# Patient Record
Sex: MALE | Race: WHITE | HISPANIC OR LATINO | Employment: FULL TIME | URBAN - METROPOLITAN AREA
[De-identification: names, ages, dates, MRNs, and addresses within clinical notes are randomized per-mention and may not be internally consistent; named-entity substitution may affect disease eponyms.]

---

## 2021-01-14 ENCOUNTER — AMBULATORY - HEALTHEAST (OUTPATIENT)
Dept: CARDIOLOGY | Facility: CLINIC | Age: 26
End: 2021-01-14

## 2021-01-14 DIAGNOSIS — Z00.6 EXAMINATION OF PARTICIPANT IN CLINICAL TRIAL: ICD-10-CM

## 2021-01-14 ASSESSMENT — MIFFLIN-ST. JEOR: SCORE: 1679.11

## 2021-02-04 ENCOUNTER — AMBULATORY - HEALTHEAST (OUTPATIENT)
Dept: CARDIOLOGY | Facility: CLINIC | Age: 26
End: 2021-02-04

## 2021-02-04 DIAGNOSIS — Z00.6 EXAMINATION OF PARTICIPANT IN CLINICAL TRIAL: ICD-10-CM

## 2021-02-18 ENCOUNTER — AMBULATORY - HEALTHEAST (OUTPATIENT)
Dept: CARDIOLOGY | Facility: CLINIC | Age: 26
End: 2021-02-18

## 2021-02-18 DIAGNOSIS — Z00.6 EXAMINATION OF PARTICIPANT OR CONTROL IN CLINICAL RESEARCH: ICD-10-CM

## 2021-05-21 ENCOUNTER — OFFICE VISIT (OUTPATIENT)
Dept: URGENT CARE | Facility: URGENT CARE | Age: 26
End: 2021-05-21
Payer: OTHER MISCELLANEOUS

## 2021-05-21 VITALS
SYSTOLIC BLOOD PRESSURE: 120 MMHG | TEMPERATURE: 96 F | WEIGHT: 135 LBS | OXYGEN SATURATION: 99 % | HEART RATE: 84 BPM | DIASTOLIC BLOOD PRESSURE: 77 MMHG

## 2021-05-21 DIAGNOSIS — S61.206A: Primary | ICD-10-CM

## 2021-05-21 PROCEDURE — 99207 PR NO CHARGE LOS: CPT | Performed by: FAMILY MEDICINE

## 2021-05-21 PROCEDURE — 12001 RPR S/N/AX/GEN/TRNK 2.5CM/<: CPT | Performed by: FAMILY MEDICINE

## 2021-05-22 NOTE — PATIENT INSTRUCTIONS
Patient Education     Laceration of an Arm or Leg: Stitches, Staples, or Tape   A laceration is a cut through the skin. If it's deep or it's gaping open, it may require stitches or staples to close so it can heal. Minor cuts may be treated with surgical tape closures, or skin glue.   X-rays may be done if something may have entered the skin through the cut. You may also need a tetanus shot if you are not up to date on this vaccine.   Home care    Follow the healthcare provider s instructions on how to care for the cut.    Wash your hands with soap and clean, running water before and after caring for your wound. This is to help prevent infection.    Keep the wound clean and dry. If a bandage was applied and it becomes wet or dirty, replace it. Otherwise, leave it in place for the first 24 hours, then change it once a day or as directed.    If stitches or staples were used, clean the wound daily:  ? After removing the bandage, wash the area with soap and water. Use a wet cotton swab to loosen and remove any blood or crust that forms.  ? After cleaning, keep the wound clean and dry. Talk with your healthcare provider before putting any antibiotic ointment on the wound. Reapply the bandage.    Remove the bandage to shower as usual after the first 24 hours, but don't soak the area in water (no swimming) until the stitches or staples are removed.    If surgical tape closures were used, keep the area clean and dry. If it becomes wet, blot it dry with a towel. Let the surgical tape fall off on its own.    Follow the healthcare provider's instructions for any medicines prescribed.  ? The provider may prescribe an antibiotic cream or ointment to prevent infection. He or she may also prescribe an antibiotic pill. Don't stop taking this medicine until you have finished it all or the provider tells you to stop.  ? The provider may also prescribe medicine for pain. Follow the instructions exactly for how to take these  medicines.    Don't do activities that may reopen your wound.    Follow-up care  Follow up with your healthcare provider, or as advised. Most skin wounds heal within 10 days. But an infection may sometimes occur even with proper treatment. Check the wound daily for the signs of infection listed below. Stitches and staples should be removed within 7 to14 days. If surgical tape closures were used, you may remove them after 10 days if they have not fallen off by then.    When to seek medical advice  Call your healthcare provider right away if any of these occur:    Wound bleeding not controlled by direct pressure    Signs of infection, including increasing pain in the wound, increasing wound redness or swelling, or pus or bad odor coming from the wound    Chills, fever of 100.4 F (38 C) or higher, or as directed by your healthcare provider    Stitches or staples coming apart or falling out or surgical tape falling off before 7 days    Wound edges reopening    Color changes in the wound    Numbness around the wound     Decreased movement around the injured area  [employer] last reviewed this educational content on 6/1/2020 2000-2021 [employer]. All rights reserved. This information is not intended as a substitute for professional medical care. Always follow your healthcare professional's instructions.

## 2021-05-22 NOTE — PROGRESS NOTES
Assessment & Plan     Open wound of right little finger without damage to nail, initial encounter  Wound was closed as noted below in the procedure note.  Follow-up is recommended in 1 week for suture removal.  Work status report was completed.  Indications for follow-up were reviewed.  Risks of ischemic injury to the flap were reviewed with the patient.  After cares were also reviewed.  - REPAIR SUPERFICIAL, WOUND BODY < =2.5CM                 Return in about 1 week (around 5/28/2021) for suture removal.    Miguel Ahuja MD  [facility] [place] Harrison is a 25 year old who presents for the following health issues     HPI       Patient comes in with work comp injury.  Date of injury is May 21, 2021, employer is [employer].  Patient was pushing a cart with a white board on it when he sustained a laceration to the distal portion of his right fifth finger.  He was told to go to urgent care for an assessment after reporting it to his supervisor.    Review of Systems   Constitutional, HEENT, cardiovascular, pulmonary, gi and gu systems are negative, except as otherwise noted.      Objective    /77 (BP Location: Right arm, Patient Position: Sitting, Cuff Size: Adult Regular)   Pulse 84   Temp 96  F (35.6  C) (Tympanic)   Wt 61.2 kg (135 lb)   SpO2 99%   There is no height or weight on file to calculate BMI.  Physical Exam   GENERAL: healthy, alert and no distress  EYES: Eyes grossly normal to inspection, PERRL and conjunctivae and sclerae normal  MS: There is a V shaped laceration on the flexor aspect of the right fifth finger.  Flap attachment is distal.  There is no nail involvement.  Distal neurovascular function was assessed prior to instillation of anesthesia and normal.  Flexor tendon function was normal.  NEURO: Normal strength and tone, mentation intact and speech normal  PSYCH: mentation appears normal, affect normal/bright        PROCEDURE NOTE:    Patient was  placed in the supine position.  The finger was cleaned with Betadine.  No foreign bodies were noted.  Anesthesia was instilled locally through the laceration.  It was a very sharp V-shaped cut with the attachment distal.  5 sutures were placed along the longer side and one suture was placed along the shorter side of the V.  Total of six 4-0 nylon sutures were placed.  Patient tolerated procedure well with good circulation afterwards.  No complications were noted.

## 2021-05-27 VITALS
TEMPERATURE: 97.2 F | RESPIRATION RATE: 16 BRPM | SYSTOLIC BLOOD PRESSURE: 99 MMHG | OXYGEN SATURATION: 97 % | HEART RATE: 69 BPM | DIASTOLIC BLOOD PRESSURE: 61 MMHG

## 2021-05-28 ENCOUNTER — OFFICE VISIT (OUTPATIENT)
Dept: URGENT CARE | Facility: URGENT CARE | Age: 26
End: 2021-05-28
Payer: OTHER MISCELLANEOUS

## 2021-05-28 VITALS
DIASTOLIC BLOOD PRESSURE: 62 MMHG | SYSTOLIC BLOOD PRESSURE: 110 MMHG | TEMPERATURE: 97.9 F | OXYGEN SATURATION: 97 % | HEART RATE: 65 BPM

## 2021-05-28 DIAGNOSIS — S61.206A: Primary | ICD-10-CM

## 2021-05-28 PROCEDURE — 99207 PR NO CHARGE LOS: CPT | Performed by: FAMILY MEDICINE

## 2021-05-28 NOTE — LETTER
REPORT OF WORK COMP    [facility] [place]  [address]  [place] [id number] [phone]      PATIENT DATA    Employee Name: Hunter Huitron      : 1995     #: [id number]    Work related injury: Yes  Employer at time of injury: [employer]  Employer contact & phone:    Employed elsewhere? No  Workers' Compensation Carrier/Managed Care Plan:       Today's date: 2021  Date of injury: 2021  Date of first visit: 2021    PROVIDER EVALUATION: Please fill in as needed.  Please give copy to employee for employer.    1. Diagnosis: open wound right fifth finger    2. Treatment: suturing.  3. Medication: none other than otc  NOTE: When ordering a medication, [place] Rules require [place] or WC on prescriptions.    4. No work from NA to NA.  5. Return to work date: 2021   ** WITH RESTRICTIONS? Yes, with work restrictions: * Keep injury site clean and dry  DURATION OF LIMITATIONS: No immersion for the next 3 days, allow dressing to be worn at work.  Restrictions  on 2021      RESTRICTIONS: Unlimited unless listed.  Restrictions apply to home and leisure also.  If work restrictions is not available, the employee is totally disabled.    Maximum Medical Improvement (Date): 2021  Any Permanent Partial Disability? 0%    Provider comments: follow up prn    Medical Examiner: Miguel Ahuja MD            License or registration: [id number]    Next appointment: As needed    CC: Employer, Managed Care Plan/Payor, Patient

## 2021-05-29 NOTE — PROGRESS NOTES
Assessment & Plan     Open wound of right little finger without damage to nail, initial encounter  Sutures were removed today.  After cares were reviewed.  Work status report was completed.  Follow-up is recommended as needed.                   No follow-ups on file.    Miguel Ahuja MD    [facility] SOL[place] Harrison is a 26 year old who presents for the following health issues     HPI       Patient comes in for follow-up of work comp injury.  Employer is [employer].  Date of injury is May 21, 2021.  Injury was a laceration to the right fifth finger.  He returns today for suture removal.  Minimal discomfort is noted.  Tolerating work okay.    Review of Systems   Constitutional, HEENT, cardiovascular, pulmonary, gi and gu systems are negative, except as otherwise noted.      Objective    /62   Pulse 65   Temp 97.9  F (36.6  C) (Tympanic)   SpO2 97%   There is no height or weight on file to calculate BMI.  Physical Exam   GENERAL: healthy, alert and no distress  EYES: Eyes grossly normal to inspection, PERRL and conjunctivae and sclerae normal  SKIN: Sutures were removed.  There was a little bit of separation distally but the V-shaped flap is viable.  Distal neurovascular function is intact.  NEURO: Normal strength and tone, mentation intact and speech normal  PSYCH: mentation appears normal, affect normal/bright

## 2021-06-05 VITALS
SYSTOLIC BLOOD PRESSURE: 127 MMHG | HEIGHT: 72 IN | HEART RATE: 74 BPM | RESPIRATION RATE: 18 BRPM | TEMPERATURE: 97.7 F | BODY MASS INDEX: 19.23 KG/M2 | OXYGEN SATURATION: 99 % | WEIGHT: 142 LBS | DIASTOLIC BLOOD PRESSURE: 62 MMHG

## 2021-06-05 VITALS
WEIGHT: 135.7 LBS | DIASTOLIC BLOOD PRESSURE: 70 MMHG | BODY MASS INDEX: 18.18 KG/M2 | RESPIRATION RATE: 15 BRPM | SYSTOLIC BLOOD PRESSURE: 123 MMHG | TEMPERATURE: 97.8 F | OXYGEN SATURATION: 98 % | HEART RATE: 73 BPM

## 2021-06-27 ENCOUNTER — HEALTH MAINTENANCE LETTER (OUTPATIENT)
Age: 26
End: 2021-06-27

## 2021-06-30 NOTE — PROGRESS NOTES
Progress Notes by Akash Lynne RN at 2/18/2021 10:30 AM     Author: Akash Lynne RN Service: -- Author Type: Registered Nurse    Filed: 2/18/2021 11:10 AM Encounter Date: 2/18/2021 Status: Signed    : Akash Lynne RN (Registered Nurse)           Visit 3  02/18/2021  In person, scheduled  In the last 2 weeks, did the participant attend any large gatherings (> 10 people), or come in close contact (<6 feet) with a person known to  have COVID-19, returned to school or to work in-person?  [] Yes   [x] No    Ongoing consent process: review schedule of events for today, and upcoming appointments; provided him with an update on known overall study progress; reviewed eDiary expectations; reiterated expected AEs (especially associated with injection site).  he has no further questions or concerns at this time.     Adverse Event/Serious Adverse Event: asked participant if any AE/SAEs occurred since last contact 02/04/2021   he denies AE/SAEs     Physical Exam   Please refer to NP note for PE details  /70 (Patient Site: Left Arm, Patient Position: Sitting, Cuff Size: Adult Regular)   Pulse 73   Temp 97.8  F (36.6  C)   Resp 15   Wt 135 lb 11.2 oz (61.6 kg)   SpO2 98%   BMI 18.18 kg/m          Study labs ([facility], Immunogenicity, SARS-CoV-2 (anti-NP)  drawn   [x] Yes   Time 10.53 [] No, why?   Requisition number:  [id number]      Reviewed eDiary with participant.  Study team reviewed completion instructions with participant and questions answered to his satisfaction.      Plan: Next study visit (Visit 4) to be scheduled.   NOTE: remind participant that this next visit will occur @ [facility] (noted on AVS)      Akash Lynne RN

## 2021-06-30 NOTE — PROGRESS NOTES
Progress Notes by Gisella Carmen PA-C at 1/14/2021  9:00 AM     Author: Gisella Carmen PA-C Service: -- Author Type: Physician Assistant    Filed: 1/14/2021  9:47 AM Encounter Date: 1/14/2021 Status: Signed    : Gisella Carmen PA-C (Physician Assistant)             What was the body system examined: Defaulted value based on protocol requirements and will not require .   System  Normal/Abnormal  If abnormal, CS?   If abnormal, describe    Skin  Normal  Not applicable     HEENT Normal  Not applicable     Neck  Normal  Not applicable     Thyroid  Normal  Not applicable      Lungs  Normal  Not applicable     Heart  Normal  Not applicable     Abdomen  Normal  Not applicable     Lymph Nodes  Normal  Not applicable     Musculoskeletal/Extremities  Normal  Not applicable     Other  Normal  Not applicable        What was the overall interpretation: Please select from the dropdown list.  Normal    AMY Valle PA-C

## 2021-06-30 NOTE — PROGRESS NOTES
"Progress Notes by Stefanie Handley RN at 2/4/2021 10:30 AM     Author: Stefanie Handley RN Service: -- Author Type: Registered Nurse    Filed: 2/5/2021  9:46 AM Encounter Date: 2/4/2021 Status: Addendum    : Stefanie Handley RN (Registered Nurse)    Related Notes: Original Note by Stefanie Handley RN (Registered Nurse) filed at 2/4/2021 12:03 PM     Summary: Novavax Day 21          Visit 2  2/4/21  In person, scheduled  Seated @ 10:49  In the last 2 weeks, did the participant attend any large gatherings (> 10 people), or come in close contact (<6 feet) with a person known to  have COVID-19, returned to school or to work in-person?  [] Yes   [x] No    Reviewed Inclusion/Exclusion criteria--please refer to that note for details and for co-sign by Dr. Mejia.     Ongoing consent process: review schedule of events for today, and upcoming appointments; provided him with an update on known overall study progress; reviewed eDiary expectations; reiterated expected AEs.    he has no further questions or concerns at this time.     Adverse Event/Serious Adverse Event: asked participant if any AE/SAEs occurred since last contact 1/14/21.   he denies AE/SAEs    Physical Exam   Please refer to Gisella Carmen note for PE details    Visit Vitals completed 11:01  after being seated for 10\"    BP 99/61   Pulse 69   Temp 97.2  F (36.2  C) (Oral)   Resp 16   SpO2 97%       Study labs SARS-CoV-2 vaccine immunogenicity (IgG antibody to SARS-CoV-2 S protein, [place], hACE2 inhibition) drawn   [x] Yes   Time 11:10      [] No, why?        Reviewed eDiary with participant.  Study team reviewed completion instructions with participant and questions answered to his satisfaction.  Participant waited in Clinical Research Unit (CRU) for @ least 30 minutes after receiving injection.  Participant experienced no adverse symptoms prior to leaving CRU    Plan: Next study visit (Visit 3) scheduled 2/18/21@1030.      Stefanie Handley RN         "

## 2021-06-30 NOTE — PROGRESS NOTES
Progress Notes by Gisella Carmen PA-C at 2/4/2021 10:30 AM     Author: Gisella Carmen PA-C Service: -- Author Type: Physician Assistant    Filed: 2/4/2021 11:37 AM Encounter Date: 2/4/2021 Status: Signed    : Gisella Carmen PA-C (Physician Assistant)             What was the body system examined: Defaulted value based on protocol requirements and will not require .   System  Normal/Abnormal  If abnormal, CS?   If abnormal, describe    Skin  Normal  Not applicable     HEENT Normal  Not applicable     Neck  Normal  Not applicable     Thyroid  Normal  Not applicable      Lungs  Normal  Not applicable     Heart  Normal  Not applicable     Abdomen  Normal  Not applicable     Lymph Nodes  Normal  Not applicable     Musculoskeletal/Extremities  Normal  Not applicable     Other  Normal  Not applicable        What was the overall interpretation: Please select from the dropdown list.  Normal    AMY Valle PA-C

## 2021-06-30 NOTE — PROGRESS NOTES
Progress Notes by Gisella Carmen PA-C at 2/18/2021 10:30 AM     Author: Gisella Carmen PA-C Service: -- Author Type: Physician Assistant    Filed: 2/18/2021 11:23 AM Encounter Date: 2/18/2021 Status: Signed    : Gisella Carmen PA-C (Physician Assistant)             What was the body system examined: Defaulted value based on protocol requirements and will not require .   System  Normal/Abnormal  If abnormal, CS?   If abnormal, describe    Skin  Normal  Not applicable     HEENT Normal  Not applicable     Neck  Normal  Not applicable     Thyroid  Normal  Not applicable      Lungs  Normal  Not applicable     Heart  Normal  Not applicable     Abdomen  Normal  Not applicable     Lymph Nodes  Normal  Not applicable     Musculoskeletal/Extremities  Normal  Not applicable     Other  Normal  Not applicable        What was the overall interpretation: Please select from the dropdown list.  Normal    AMY Valle PA-C

## 2021-06-30 NOTE — PROGRESS NOTES
"Progress Notes by Aniyah Olivares RN at 1/14/2021  9:00 AM     Author: Aniyah Olivares RN Service: -- Author Type: Registered Nurse    Filed: 1/14/2021 12:44 PM Encounter Date: 1/14/2021 Status: Signed    : Elena Mejia MD (Physician)    Related Notes: Original Note by Aniyah Olivares RN (Registered Nurse) filed at 1/14/2021 11:22 AM           Study Name: PREVENT-19  : Eli Davis MD   Sub Investigator: Satish Mejia MD    Protocol version: 3.0, 16 NOV 2020    Inclusion Criteria  Criteria #   Inclusion Criteria (all must be yes)    1  Adults ? 18 years of age at screening who, by virtue of age, race, ethnicity or life circumstances, are considered at substantial risk of exposure to and infection with SARS-CoV-2   Yes   2  Willing and able to give informed consent prior to study enrollment and to comply with study procedu   Yes   3  Participants of childbearing potential (defined as any participant who has experienced menarche and who is NOT surgically sterile [ie, hysterectomy, bilateral tubal ligation, or bilateral oophorectomy] or postmenopausal [defined as amenorrhea at least 12 consecutive months]) must agree to be heterosexually inactive from at least 28 days prior to enrollment and through 3 months after the last vaccination OR agree to consistently use a medically acceptable method of contraception from at least 28 days prior to enrollment and through 3 months after the last vaccination   Yes   4  Is medically stable, as determined by the investigator (based on review of health status, vital signs (TPRBP) medical history, & targeted physical examination (weight)  VS must be within medically acceptable ranges prior to the first vaccination.   Yes   5  Agree to not participate in any other SARS-CoV-2 prevention trial during the study follow-up.   Yes     Exclusion Criteria  Criteria #  -all must be \"no\"    1  Unstable acute or chronic illness. Criteria for unstable " medical conditions include   a. Substantive changes in chronic prescribed medication (change in class or significant change in dose) in the past 2 months  b. Currently undergoing workup of undiagnosed illness that could lead to diagnosis of a new condition   Note: Well-controlled human immunodeficiency virus [HIV] with undetectable HIV RNA and CD4 count > 200 cells/?L for at least 1 year, documented within the last 6 months, is NOT considered an unstable chronic illness.    No   2  Participation in research involving an investigational product (drug/biologic/device) within 45 days prior to first study vaccination   No   3  History of a previous laboratory-confirmed diagnosis of SARS-CoV-2 infection or COVID-19   No   4  Received influenza vaccination or any other adult vaccine within 4 days prior to or within 7 days after either study vaccination   No   5  Autoimmune or immunodeficiency disease/condition (iatrogenic or congenital) requiring ongoing immunomodulatory therapy NOTE: Stable endocrine disorders (eg, thyroiditis, pancreatitis), including stable diabetes mellitus with no history of diabetic ketoacidosis) are NOT excluded   No   6  Chronic administration (defined as > 14 continuous days) of immunosuppressant, systemic glucocorticoids, or other immune-modifying drugs within 90 days prior to first study vaccination. NOTE: An immunosuppressant dose of glucocorticoid is defined as a systemic dose ? 20 mg of prednisone per day or equivalent. The use of topical, inhaled, and nasal glucocorticoids is permitted. Topical tacrolimus and ocular cyclosporin are permitted   No   7  Received immunoglobulin, blood-derived products, or immunosuppressant drugs within 90 days prior to first study vaccination   No   8  Active cancer (malignancy) on therapy within 1 year prior to first study vaccination (with the exception of malignancy cured via excision, at the discretion of the investigator)   No   9  Any known allergies to  products contained in the investigational product.   No   10  Participants who are breastfeeding, pregnant or who plan to become pregnant within 3 months following last study vaccination   No   11  Any other condition that, in the opinion of the investigator, would pose a health risk to the participant if enrolled or could interfere with evaluation of the trial vaccine or interpretation of study results.   No   12  Study team member or first-degree relative of any study team member (inclusive of Sponsor, and study site personnel involved in the study)   No   13  Current participation in any other COVID-19 prevention clinical trial.   No       Subject has met all inclusion criteria and no exclusion criteria have been met.   Subject is ready to fully enrolled in the PREVENT-19 study.    Aniyah Olivares RN

## 2021-06-30 NOTE — PROGRESS NOTES
"Progress Notes by Aniyah Olivares RN at 1/14/2021  9:00 AM     Author: Aniyah Olivares RN Service: -- Author Type: Registered Nurse    Filed: 1/14/2021 11:58 AM Encounter Date: 1/14/2021 Status: Addendum    : Aniyah Olivares RN (Registered Nurse)    Related Notes: Original Note by Aniyah Olivares RN (Registered Nurse) filed at 1/14/2021 11:22 AM           Visits Screening/Baseline (1)    Time seated: 0903    The study discussion continued with an introduction of the study purpose and the qualifications for participation.     The consent discussion included the following:    Description of trial    Number of Participants    Risks and Discomforts    Unforeseeable Risks    Benefits    Alternative Procedures or Treatments    Confidentiality    Compensation and Medical Treatments in Event of Injury    Contacts    Voluntary Participation    Involuntary Termination of Participant's Participation    Additional Costs to Participant    Consequences of Participant's Decision to Withdraw    Providing Significant New Findings to Participants      Hunter was provided time to consider participation and ask questions.  All questions answered to his satisfaction.  Ganga was queried regarding their understanding of the trial. He was able to correctly answer the following questions:    Double blind placebo control    Follow up visits    Need to report side effects and/or possible COVID-19 symptoms       Ganga has agreed to participate in the \"PREVENT-19\" COVID-19 vaccine clinical trial.  Consent form signed (version 3, IRB approved Nov 25, 2020), copies of consent signatures provided to participant.  No study procedures performed prior to obtaining consent to participate.        In person, scheduled  In the last 2 weeks, did the participant attend any large gatherings (> 10 people), or come in close contact (<6 feet) with a person known to  have COVID-19, returned to school or to work in-person?  [] Yes   [x] " "No    Reviewed Inclusion/Exclusion criteria--please refer to that note for details and for co-sign by Dr. Mejia/RON.    1995   male  Ethnicity   [x]  or    [] Not  or   Race   []  or    []    [] Black or   []  or other    [x] White    Occupation   Attending school in person   [] Yes   [x] No   Currently working    [x] Yes   [] No    If yes: Required to be in close proximity (<6 ft) to other people?   [x] Yes   [] No   How often is participant required to be present in workplace (I.e., not work from home [[employer]])    [] 0 days/week  [] 1 day/week  [] 2-4 days/week  [x] >5 days/week  Do people in participant's main workplace use PPE? [x] Yes   [] No    Living Situation  How many people live with the subject (other than subject)? 3  Total people under 18 years of age 0  Total people between 18-64 years of age 4  Total people 65 years of age or older  0    Lifestyle  Does the subject have a history of smoking or vaping? [] Yes   [x] No  Is the subject currently smoking or vaping?   [] Yes   [x] No      Randomization  Date of randomization: 1/14/2021  Randomization number: Pt Number: -[id number] Random Number: 1650  Age group  [x] 18-64 years  [] > 65 years    Physical Exam   Please refer to [facility] note for PE details  VS-T,P, R, BP, wt, ht  Visit Vitals  /62 (Patient Site: Left Arm, Patient Position: Sitting, Cuff Size: Adult Regular)   Pulse 74   Temp 97.7  F (36.5  C) (Oral)   Resp 18   Ht 6' 0.44\" (1.84 m)   Wt 142 lb (64.4 kg)   SpO2 99%   BMI 19.02 kg/m      Vitals:    01/14/21 0928   Weight: 142 lb (64.4 kg)      Height: 184 cm      Study labs (Immunogenicity, SARS-CoV-2 (anti-NP))  drawn   [x] Yes   Time 0947 [] No, why?     Nasal swab collection:  [x] Yes   Time 1015  [] No, why?        Administrations This Visit     Study SARS-CoV-2 vaccine vs. placebo (IDS# [id number]) injection " injection 0.5 mL     Admin Date  01/14/2021 at 1052 Action  Given Dose  0.5 mL Route  Intramuscular Administered By  Stefanie Handley RN                Participant waited in Clinical Research Unit (CRU) for @ least 30 minutes after receiving injection.  Participant experienced no adverse symptoms prior to leaving CRU  Study team reviewed completion instructions with participant  Participant provided with eDiary to record all subsequent AEs and COVID-19 symptomatology.  Participant provided with nasal swab home collection kit and instructions on completion (refer to XXX for complete details)  Participant provided with thermometer and reviewed instructions on how to take oral temperature.  Participant provided with a ruler to measure any site reactions that occur; instructed on how to use.  Participant provided with After Visit Summary that includes these instructions and next appointments.  he confirms that all questions have been answered to his satisfaction.    Plan:    Discharge time 1125        No current outpatient medications on file.     No past medical history on file.

## 2021-06-30 NOTE — PROGRESS NOTES
Progress Notes by Stefanie Handley RN at 2/4/2021 10:30 AM     Author: Stefanie Handley RN Service: -- Author Type: Registered Nurse    Filed: 2/4/2021 12:06 PM Encounter Date: 2/4/2021 Status: Addendum    : Stefanie Handley RN (Registered Nurse)    Related Notes: Original Note by Stefanie Handley RN (Registered Nurse) filed at 2/4/2021 12:03 PM     Summary: Novavax           PREVENT-19 Day 21  Thank you for coming in today for your 2nd injection.  Please continue to add your temperature daily in the she.  Your next visit is Day 35. We have scheduled this for 2/18/21@1030.    No adverse events since last visit.    If you have questions, you may call the [facility] Research department at [phone]    Best regards,  Stefanie Handley RN

## 2021-10-17 ENCOUNTER — HEALTH MAINTENANCE LETTER (OUTPATIENT)
Age: 26
End: 2021-10-17

## 2022-02-10 DIAGNOSIS — Z00.6 RESEARCH SUBJECT: Primary | ICD-10-CM

## 2022-05-27 ENCOUNTER — HOSPITAL ENCOUNTER (OUTPATIENT)
Dept: RESEARCH | Facility: CLINIC | Age: 27
Discharge: HOME | End: 2022-05-27
Attending: INTERNAL MEDICINE

## 2022-05-27 PROCEDURE — 510N000009 HC RESEARCH FACILITY, PER 15 MIN

## 2022-05-27 PROCEDURE — 510N000017 HC CRU PATIENT CARE, PER 15 MIN

## 2022-05-27 PROCEDURE — 300N000003 HC RESEARCH SPECIMEN PROCESSING, SIMPLE

## 2022-05-27 NOTE — ADDENDUM NOTE
Encounter addended by: Lenny Culp on: 5/27/2022 3:39 PM   Actions taken: Charge Capture section accepted

## 2022-07-23 ENCOUNTER — HEALTH MAINTENANCE LETTER (OUTPATIENT)
Age: 27
End: 2022-07-23

## 2022-10-07 ENCOUNTER — HOSPITAL ENCOUNTER (OUTPATIENT)
Dept: RESEARCH | Facility: CLINIC | Age: 27
Discharge: HOME | End: 2022-10-07
Attending: INTERNAL MEDICINE

## 2022-10-07 PROCEDURE — 510N000017 HC CRU PATIENT CARE, PER 15 MIN

## 2022-10-07 PROCEDURE — 510N000009 HC RESEARCH FACILITY, PER 15 MIN

## 2022-10-11 NOTE — ADDENDUM NOTE
Encounter addended by: Lenny Culp on: 10/11/2022 1:37 PM   Actions taken: Charge Capture section accepted

## 2023-02-06 ENCOUNTER — HOSPITAL ENCOUNTER (OUTPATIENT)
Dept: RESEARCH | Facility: CLINIC | Age: 28
Discharge: HOME | End: 2023-02-06
Attending: INTERNAL MEDICINE

## 2023-02-06 PROCEDURE — 510N000017 HC CRU PATIENT CARE, PER 15 MIN

## 2023-02-06 PROCEDURE — 510N000009 HC RESEARCH FACILITY, PER 15 MIN

## 2023-02-06 NOTE — ADDENDUM NOTE
Encounter addended by: Elsy Mendes RN on: 2/6/2023 10:29 AM   Actions taken: Charge Capture section accepted

## 2023-08-12 ENCOUNTER — HEALTH MAINTENANCE LETTER (OUTPATIENT)
Age: 28
End: 2023-08-12

## 2024-07-31 ENCOUNTER — THERAPY VISIT (OUTPATIENT)
Dept: OCCUPATIONAL THERAPY | Facility: CLINIC | Age: 29
End: 2024-07-31
Payer: COMMERCIAL

## 2024-07-31 DIAGNOSIS — M79.642 PAIN IN BOTH HANDS: Primary | ICD-10-CM

## 2024-07-31 DIAGNOSIS — M79.641 PAIN IN BOTH HANDS: Primary | ICD-10-CM

## 2024-07-31 PROCEDURE — 97535 SELF CARE MNGMENT TRAINING: CPT | Mod: GO | Performed by: OCCUPATIONAL THERAPIST

## 2024-07-31 PROCEDURE — 97166 OT EVAL MOD COMPLEX 45 MIN: CPT | Mod: GO | Performed by: OCCUPATIONAL THERAPIST

## 2024-07-31 NOTE — PROGRESS NOTES
OCCUPATIONAL THERAPY EVALUATION  Type of Visit: Evaluation    See electronic medical record for Abuse and Falls Screening details.    Patient is Right hand dominant    Subjective      Presenting condition or subjective complaint:    Patient presents due to pain and hypermobility in the hands/fingers and thumbs.  Pt notes that  strength in general has gone down a bit. Will have discomfort with driving, computing, being on phone. Pt notes that he and his sister have  hypermobility. The joints will just bend on their own even with daily activities.  Date of onset: 07/31/24    Relevant medical history:  none  Dates & types of surgery:  none    Prior diagnostic imaging/testing results:     none  Prior therapy history for the same diagnosis, illness or injury:    none    Prior Level of Function  Transfers: Independent  Ambulation: Independent  ADL: Independent  IADL: IND in all IADLs    Living Environment:   Patient is independent at home and there are no concerns about accessibility and mobility in the home.    Employment:    [employer] software design, works on laptop or desktop  Hobbies/Interests:  Video games - does not notice it often. Will notice it with knitting.     Patient goals for therapy:  Interested in ring splints and any other ways to address pain and functional activities.    Objective     OBJECTIVE:    Pain Level (Scale 0-10)   7/31/2024   At Rest 0   At worst Can be 2-4     Pain Description  Date 7/31/2024   Location Fingers of B hands. R small finger will be sore with phone.   Pain Quality Achy, not sharp. Like when you run and things get stiff. Once in a while a random sharp pain   Frequency intermittent     Pain is worst  Mostly notices it as discomfort when using hands too much   Exacerbated by  overuse   Relieved by rest     Beighton Hypermobility Scale:  Did not complete.      Edema:   Presently  none of the  wrist, hand and thumb. Will feel puffy if the hands are down for too long.    Sensation  "  WNL throughout all nerve distributions; per patient report    RANGE OF MOTION:    Shoulders:   Pt comments: L shoulder can be bothersome, sometimes it does \"not stay where it's supposed to\" and has been working on some strengthening.  R shoulder is stronger and seems to do better, can be sore at the end of the day. B shoulder flexion is likely hypermobile per observation.    Elbows  Pt comments: R elbow feels often per pt./ The L feels like its wants to pop often.    Clinical observations: very mild HE noted to B elbows    Wrists  Pt comments: Pt has some pain once in a while  Inspection: no ulnar sag, no overtly prominent ulnar head, no gross deformities.    Thumbs  Pt comments: Pt is able to touch the thumb tips to the forearms (not observed)  AROM  L R   Pain / comments   MPj hyperextension mild  none     IP hyperextension extreme  extreme     CMC hypermobility moderate  moderate      Fingers  Pt comments:  R SF MP and SF will get sore with holding phone  AROM and gentle PROM MPj PIPj DIPj    hyperextension   severe  moderate  severe     Instability in lateral<>medial plane mild mild  mild       Strength:  Pain-free /Pinch Test   7/31/2024    Trials L R   1   65 85 at the end, wrist hurt     Lat Pinch 7/31/2024    Trials L R   1   15.5  mild HE of the MPj 19 very mild HE of the MPj     3 Pt Pinch 7/31/2024    Trials L R   1   14 16     Palpation:    Deferred     Measures for Finger 3 point splints (brand: Oval 8 [employer])  *=customized orthosis  7/31/2024 R   Thumb IP:11 *   Middle PIP:9 *     Assessment & Plan   CLINICAL IMPRESSIONS  Medical Diagnosis:    NA (self-referred)  Treatment Diagnosis: Bilateral hand pain    Impression/Assessment: Pt is a 29 year old male presenting to Occupational Therapy due to pain of the bilateral hands and joint instability related to hypermobility.  The following significant findings have been identified: Impaired activity tolerance and Pain.  These identified " deficits interfere with their ability to perform self care tasks, work tasks, and recreational activities as compared to previous level of function.     Clinical Decision Making (Complexity):  Assessment of Occupational Performance: 3-5 Performance Deficits  Occupational Performance Limitations: hygiene and grooming, home establishment and management, meal preparation and cleanup, work, and leisure activities  Clinical Decision Making (Complexity): Moderate complexity    PLAN OF CARE  Treatment Interventions:  Therapeutic Exercise:  AROM, Isometrics, and Stabilization  Neuromuscular re-education:  Proprioceptive Training and Kinesiotaping  Orthotic Fabrication:  Static, Finger based, and Hand based  Self Care:  Self Care Tasks, Ergonomic Considerations, and Work Tasks    Long Term Goals   OT Goal 1  Goal Description: Pt will be IND in a comprehensive program to manage pain of the B hands, thumbs, and fingers, including  orthoses, incorporating 2 new joint protection techniques and performing a pain free exercise program, in order to optimize self management of pain and joint instability during ADLs.  Target Date: 10/09/24      Frequency of Treatment: 6 visits  Duration of Treatment: in 10 weeks     Recommended Referrals to Other Professionals:  N/A  Education Assessment: Learner/Method: Patient;No Barriers to Learning;Listening;Reading;Demonstration;Pictures/Video     Risks and benefits of evaluation/treatment have been explained.   Patient/Family/caregiver agrees with Plan of Care.     Evaluation Time:    OT Eval, Moderate Complexity Minutes (83037): 46       Signing Clinician: Shaneka Traore OT

## 2024-08-08 ENCOUNTER — THERAPY VISIT (OUTPATIENT)
Dept: OCCUPATIONAL THERAPY | Facility: CLINIC | Age: 29
End: 2024-08-08
Payer: COMMERCIAL

## 2024-08-08 DIAGNOSIS — M79.642 PAIN IN BOTH HANDS: Primary | ICD-10-CM

## 2024-08-08 DIAGNOSIS — M79.641 PAIN IN BOTH HANDS: Primary | ICD-10-CM

## 2024-08-08 PROCEDURE — 97535 SELF CARE MNGMENT TRAINING: CPT | Mod: GO | Performed by: OCCUPATIONAL THERAPIST

## 2024-08-08 NOTE — PROGRESS NOTES
SOAP note information for 8/8/2024.  Please refer to the daily flowsheet for treatment today, total treatment time and time spent performing 1:1 timed codes.       Measures for Finger SILVER RING orthoses:  Finger sizes:  8/8/2024 Right  proximal   distal   Index PIP 11 9.5   Thumb 13.5 12.5   Long PIP 11.5 9.5   Small PIP        Finger sizes:  8/8/2024 Left  proximal   distal   Index PIP 10 8   Thumb 12.5 11   Long PIP 11 8.5   Small PIP            Form emailed:

## 2024-09-04 ENCOUNTER — THERAPY VISIT (OUTPATIENT)
Dept: OCCUPATIONAL THERAPY | Facility: CLINIC | Age: 29
End: 2024-09-04
Payer: COMMERCIAL

## 2024-09-04 DIAGNOSIS — M79.642 PAIN IN BOTH HANDS: Primary | ICD-10-CM

## 2024-09-04 DIAGNOSIS — M79.641 PAIN IN BOTH HANDS: Primary | ICD-10-CM

## 2024-09-04 PROCEDURE — 97110 THERAPEUTIC EXERCISES: CPT | Mod: GO | Performed by: OCCUPATIONAL THERAPIST

## 2024-09-04 PROCEDURE — 97535 SELF CARE MNGMENT TRAINING: CPT | Mod: GO | Performed by: OCCUPATIONAL THERAPIST

## 2024-09-04 NOTE — PROGRESS NOTES
SOAP note information for 9/4/2024.  Please refer to the daily flowsheet for treatment today, total treatment time and time spent performing 1:1 timed codes.        Measures for Finger 3 point splints (brand: Oval 8 3 Point [employer])  *=customized orthosis  9/4/2024 R L   Small PIP: 6 * PIP: 6*       Measures for Finger SILVER RING orthoses:  Finger sizes:  9/4/2024 Right  proximal   distal   Small PIP 7.5 5.5      Finger sizes:  9/4/2024 Left  proximal   distal   Small PIP 7 5      Order form emailed 9/5/2024

## 2024-09-23 ENCOUNTER — THERAPY VISIT (OUTPATIENT)
Dept: OCCUPATIONAL THERAPY | Facility: CLINIC | Age: 29
End: 2024-09-23
Payer: COMMERCIAL

## 2024-09-23 DIAGNOSIS — M79.641 PAIN IN BOTH HANDS: Primary | ICD-10-CM

## 2024-09-23 DIAGNOSIS — M79.642 PAIN IN BOTH HANDS: Primary | ICD-10-CM

## 2024-09-23 PROCEDURE — 97110 THERAPEUTIC EXERCISES: CPT | Mod: GO | Performed by: OCCUPATIONAL THERAPIST

## 2024-09-23 NOTE — PROGRESS NOTES
Hand Therapy Discharge Note  Please refer to the daily flowsheet for treatment today, total treatment time and time spent performing 1:1 timed codes.       Current Date:  9/23/2024 09/23/24 0500   Appointment Info   Treating Provider Shaneka Traore MS, OTR/L, CHT   Total/Authorized Visits 6 poc   Visits Used 4   OT Tx Diagnosis Bilateral hand pain   Precautions/Limitations None   Progress Note/Certification   Onset of Illness/Injury or Date of Surgery 07/31/24   Therapy Frequency 6 visits   Predicted Duration in 10 weeks   Progress Note Due Date 10/09/24   Progress Note Completed Date 07/31/24   OT Goal 1   Goal Description Pt will be IND in a comprehensive program to manage pain of the B hands, thumbs, and fingers, including  orthoses, incorporating 2 new joint protection techniques and performing a pain free exercise program, in order to optimize self management of pain and joint instability during ADLs.   Target Date 10/09/24   Date Met 09/23/24   Subjective Report   Subjective Report New B SF PIPj splints fitting fine. Exercises thus far going well.   Objective Measures   Objective Measures Objective Measure 1   Self Care/Home Management   Self Care 1 Has appropriately fitting silver ring splints in place for the B thumb IPj, and B IF, MF, SF PIPj   Therapeutic Procedure/Exercise   Therapeutic Procedure: strength, endurance, ROM, flexibillity minutes (76142) 26   Ther Proc 1 Reviewed /upgraded exercises for thumb and finger stabilization   PTRx Ther Proc 1 Finger Stability   PTRx Ther Proc 1 - Details New: use of putty (pt has alot at home) Presses while keeping the IP joints in mild flexion, using putty. Also keeping fingers in mild flexion and pushing/pulling with the whole hand/UE.   PTRx Ther Proc 2  Intrinsic Finger Active Range of Motion Ab and Adduction   PTRx Ther Proc 2 - Details HEP, 10 reps, reviewed   PTRx Ther Proc 3 Thumb Stabilization 1st Dorsal Interosseous without Support   PTRx Ther  Proc 3 - Details HEP, 20 reps, reviewed   Skilled Intervention Demonstration, verbal cues and individualized guidance required for new exercises.   Education   Learner/Method Patient;No Barriers to Learning;Listening;Reading;Demonstration;Pictures/Video   Plan   Home program PTRx on phone   Comments   Comments Please note: This documentation was generated by keyboarding and dictation with voice recognition software. There may be errors that have gone undetected. Please consider this when reviewing the chart and contact writer if there are concerns.   Total Session Time   Timed Code Treatment Minutes 26   Total Treatment Time (sum of timed and untimed services) 26         Assessment/Plan:  Patient is progressing well.  Patient is ready to be discharged from therapy and continue their home treatment program.  STG/LTG:  See above for details.  This episode of care was medically necessary because the patient required skilled hand therapy to facilitate return to function at the highest expected level, given their presenting diagnosis.    DISCHARGE  Reason for Discharge: Patient has met all goals;see assessment section

## 2024-09-29 ENCOUNTER — HEALTH MAINTENANCE LETTER (OUTPATIENT)
Age: 29
End: 2024-09-29

## 2025-06-30 ENCOUNTER — MYC MEDICAL ADVICE (OUTPATIENT)
Dept: OCCUPATIONAL THERAPY | Facility: CLINIC | Age: 30
End: 2025-06-30
Payer: COMMERCIAL

## 2025-07-08 ENCOUNTER — THERAPY VISIT (OUTPATIENT)
Dept: OCCUPATIONAL THERAPY | Facility: CLINIC | Age: 30
End: 2025-07-08
Payer: COMMERCIAL

## 2025-07-08 DIAGNOSIS — M79.641 PAIN IN BOTH HANDS: Primary | ICD-10-CM

## 2025-07-08 DIAGNOSIS — M79.642 PAIN IN BOTH HANDS: Primary | ICD-10-CM
